# Patient Record
Sex: MALE | HISPANIC OR LATINO | Employment: FULL TIME | URBAN - METROPOLITAN AREA
[De-identification: names, ages, dates, MRNs, and addresses within clinical notes are randomized per-mention and may not be internally consistent; named-entity substitution may affect disease eponyms.]

---

## 2018-08-09 ENCOUNTER — OFFICE VISIT (OUTPATIENT)
Dept: URGENT CARE | Facility: CLINIC | Age: 38
End: 2018-08-09
Payer: COMMERCIAL

## 2018-08-09 VITALS
BODY MASS INDEX: 29.21 KG/M2 | WEIGHT: 197.2 LBS | TEMPERATURE: 97.9 F | RESPIRATION RATE: 16 BRPM | OXYGEN SATURATION: 96 % | DIASTOLIC BLOOD PRESSURE: 78 MMHG | HEIGHT: 69 IN | HEART RATE: 85 BPM | SYSTOLIC BLOOD PRESSURE: 116 MMHG

## 2018-08-09 DIAGNOSIS — M10.9 ACUTE GOUT OF MULTIPLE SITES, UNSPECIFIED CAUSE: ICD-10-CM

## 2018-08-09 PROCEDURE — 99204 OFFICE O/P NEW MOD 45 MIN: CPT | Performed by: NURSE PRACTITIONER

## 2018-08-09 RX ORDER — INDOMETHACIN 50 MG/1
50 CAPSULE ORAL 3 TIMES DAILY
Qty: 60 CAP | Refills: 0 | Status: SHIPPED | OUTPATIENT
Start: 2018-08-09

## 2018-08-09 NOTE — LETTER
August 9, 2018         Patient: Morales Guadarrama   YOB: 1980   Date of Visit: 8/9/2018           To Whom it May Concern:    Morales Guadarrama was seen in my clinic on 8/9/2018. He is able to return to work without restrictions.     If you have any questions or concerns, please don't hesitate to call.        Sincerely,           TORSTEN Mendez.  Electronically Signed

## 2018-08-09 NOTE — PROGRESS NOTES
Chief Complaint   Patient presents with   • Toe Pain     right greater toe pain/ left ankle pain X 1 week; needs note for work        HISTORY OF PRESENT ILLNESS: Patient is a 38 y.o. male who presents to urgent care today with complaints of a gout flare. Patient reports that he has had gout since the age of 24. He has been seen by his primary care provider for this several times and has had a thorough workup, he was told his gout is related to familial history as well as alcohol use. States that he had a flareup starting one week ago to his right great toe and left ankle. He believes this onset of gout has been exacerbated by him exercising, running, and dehydration. He denies heavy alcohol use or red meat intake. He denies associated fever, chills, malaise. He typically uses indomethacin for his gout, but has run out of medication. A family member gave him a few pills of indomethacin which seemed to improve his symptoms slightly. His symptoms today feel exactly like his previous episodes of gout.        There are no active problems to display for this patient.      Allergies:Patient has no known allergies.    No current [facility]-ordered outpatient prescriptions on file.     No current [facility]-ordered facility-administered medications on file.        Past Medical History:   Diagnosis Date   • Hyperlipidemia        Social History   Substance Use Topics   • Smoking status: Current Some Day Smoker     Types: Cigarettes   • Smokeless tobacco: Never Used   • Alcohol use Yes       No family status information on file.   History reviewed. No pertinent family history.    ROS:  Review of Systems   Constitutional: Negative for fever, chills, weight loss, malaise, and fatigue.   HENT: Negative for ear pain, nosebleeds, congestion, sore throat and neck pain.    Eyes: Negative for vision changes.   Neuro: Negative for headache, sensory changes, weakness, seizure, LOC.   Cardiovascular: Negative for chest pain, palpitations, orthopnea  "and leg swelling.   Respiratory: Negative for cough, sputum production, shortness of breath and wheezing.   Gastrointestinal: Negative for abdominal pain, nausea, vomiting or diarrhea.   Genitourinary: Negative for dysuria, urgency and frequency.  Musculoskeletal: Positive for joint pain. Negative for falls, neck pain, back pain, myalgias.   Skin: Negative for rash, diaphoresis.     Exam:  Blood pressure 116/78, pulse 85, temperature 36.6 °C (97.9 °F), resp. rate 16, height 1.753 m (5' 9\"), weight 89.4 kg (197 lb 3.2 oz), SpO2 96 %.  General: well-nourished, well-developed male in NAD  Head: normocephalic, atraumatic  Eyes: PERRLA, no conjunctival injection, acuity grossly intact, lids normal.  Ears: normal shape and symmetry, gross auditory acuity is intact.  Nose: symmetrical without tenderness, no discharge.  Mouth/Throat: reasonable hygiene, no erythema, exudates or tonsillar enlargement.  Neck: no masses, range of motion within normal limits, no tracheal deviation. No obvious thyroid enlargement.   Lymph: no cervical adenopathy. No supraclavicular adenopathy.   Neuro: alert and oriented. Cranial nerves 1-12 grossly intact. No sensory deficit.   Cardiovascular: regular rate and rhythm. No edema.  Pulmonary: no distress. Chest is symmetrical with respiration, no wheezes, crackles, or rhonchi.   Musculoskeletal: no clubbing, appropriate muscle tone, gait is stable. There is mild tenderness to right tophi and left ankle (from medial malleolus to Achilles). Skin is intact, no swelling, no erythema. Distal neurovascular is intact.  Skin: warm, dry, intact, no clubbing, no cyanosis, no rashes.   Psych: appropriate mood, affect, judgement.         Assessment/Plan:  1. Acute gout of multiple sites, unspecified cause  indomethacin (INDOCIN) 50 MG Cap       He will be given indomethacin today, RICE. Increase fluid intake, decrease alcohol intake, diet considerations. Supportive care, differential diagnoses, and " indications for immediate follow-up discussed with patient.   Pathogenesis of diagnosis discussed including typical length and natural progression.   Instructed to return to clinic or nearest emergency department for any change in condition, further concerns, or worsening of symptoms.  Patient states understanding of the plan of care and discharge instructions.  Instructed to make an appointment, for follow up, with his primary care provider.        Please note that this dictation was created using voice recognition software. I have made every reasonable attempt to correct obvious errors, but I expect that there are errors of grammar and possibly content that I did not discover before finalizing the note.      TORSTEN Mendez.

## 2021-08-05 ENCOUNTER — HOSPITAL ENCOUNTER (OUTPATIENT)
Facility: MEDICAL CENTER | Age: 41
End: 2021-08-05
Attending: PHYSICIAN ASSISTANT
Payer: COMMERCIAL

## 2021-08-05 ENCOUNTER — OFFICE VISIT (OUTPATIENT)
Dept: URGENT CARE | Facility: CLINIC | Age: 41
End: 2021-08-05
Payer: COMMERCIAL

## 2021-08-05 VITALS
TEMPERATURE: 97.9 F | WEIGHT: 208.2 LBS | BODY MASS INDEX: 31.55 KG/M2 | DIASTOLIC BLOOD PRESSURE: 72 MMHG | OXYGEN SATURATION: 95 % | HEIGHT: 68 IN | SYSTOLIC BLOOD PRESSURE: 142 MMHG | HEART RATE: 99 BPM | RESPIRATION RATE: 16 BRPM

## 2021-08-05 DIAGNOSIS — R53.83 FATIGUE, UNSPECIFIED TYPE: ICD-10-CM

## 2021-08-05 DIAGNOSIS — Z86.16 HISTORY OF 2019 NOVEL CORONAVIRUS DISEASE (COVID-19): ICD-10-CM

## 2021-08-05 DIAGNOSIS — R11.0 NAUSEA: ICD-10-CM

## 2021-08-05 DIAGNOSIS — R19.7 DIARRHEA, UNSPECIFIED TYPE: ICD-10-CM

## 2021-08-05 PROBLEM — M1A.00X0 CHRONIC GOUTY ARTHRITIS: Status: ACTIVE | Noted: 2020-11-03

## 2021-08-05 PROBLEM — N19 UREMIA: Status: ACTIVE | Noted: 2020-11-03

## 2021-08-05 PROCEDURE — U0005 INFEC AGEN DETEC AMPLI PROBE: HCPCS

## 2021-08-05 PROCEDURE — 99213 OFFICE O/P EST LOW 20 MIN: CPT | Mod: CS | Performed by: PHYSICIAN ASSISTANT

## 2021-08-05 PROCEDURE — U0003 INFECTIOUS AGENT DETECTION BY NUCLEIC ACID (DNA OR RNA); SEVERE ACUTE RESPIRATORY SYNDROME CORONAVIRUS 2 (SARS-COV-2) (CORONAVIRUS DISEASE [COVID-19]), AMPLIFIED PROBE TECHNIQUE, MAKING USE OF HIGH THROUGHPUT TECHNOLOGIES AS DESCRIBED BY CMS-2020-01-R: HCPCS

## 2021-08-05 RX ORDER — COLCHICINE 0.6 MG/1
0.6 TABLET ORAL DAILY
COMMUNITY
Start: 2021-05-21

## 2021-08-05 RX ORDER — FEBUXOSTAT 80 MG/1
TABLET, FILM COATED ORAL
COMMUNITY
Start: 2021-05-14

## 2021-08-05 RX ORDER — FEBUXOSTAT 80 MG/1
80 TABLET, FILM COATED ORAL
COMMUNITY
Start: 2021-05-21

## 2021-08-05 ASSESSMENT — ENCOUNTER SYMPTOMS
CHILLS: 1
SHORTNESS OF BREATH: 1
MYALGIAS: 1
FEVER: 0
DIARRHEA: 1
NAUSEA: 1

## 2021-08-05 NOTE — PROGRESS NOTES
"Subjective:   Morales De La GarzaFrances  is a 41 y.o. male who presents for Shortness of Breath (started 3 days ago, has seen improvement this morning), Diarrhea, and Fever          Shortness of Breath  Pertinent negatives include no fever.   Diarrhea   Associated symptoms include chills and myalgias. Pertinent negatives include no fever.   Fever   Associated symptoms include diarrhea and nausea.   Morales is a very pleasant 41-year-old gentleman who presents to urgent care with 3-day history of nausea, loose stools, fatigue, generalized body aches and chills. He has had no documented fever. Denies any congestion, sore throat, cough. He has had mild shortness of breath which is actually improved today. Patient reports that 4 of his coworkers are ill with a similar illness and are waiting for their Covid test results. Patient does have prior history of COVID-19 in 2020. He is not yet Covid vaccinated.  Review of Systems   Constitutional: Positive for chills and malaise/fatigue. Negative for fever.   Respiratory: Positive for shortness of breath.    Gastrointestinal: Positive for diarrhea and nausea.   Musculoskeletal: Positive for myalgias.   All other systems reviewed and are negative.    No Known Allergies  Reviewed past medical, surgical , social and family history.  Reviewed prescription and over-the-counter medications with patient and electronic health record today.     Objective:   /72 (BP Location: Left arm, Patient Position: Sitting, BP Cuff Size: Adult)   Pulse 99   Temp 36.6 °C (97.9 °F) (Temporal)   Resp 16   Ht 1.727 m (5' 8\")   Wt 94.4 kg (208 lb 3.2 oz)   SpO2 95%   BMI 31.66 kg/m²   Physical Exam  Vitals reviewed.   Constitutional:       Appearance: He is well-developed. He is not ill-appearing or toxic-appearing.   HENT:      Head: Normocephalic and atraumatic.      Right Ear: Tympanic membrane, ear canal and external ear normal.      Left Ear: Tympanic membrane, ear canal and external " ear normal.      Nose: Nose normal.      Mouth/Throat:      Lips: Pink.      Mouth: Mucous membranes are moist.      Pharynx: Uvula midline. No oropharyngeal exudate.   Eyes:      Conjunctiva/sclera: Conjunctivae normal.      Pupils: Pupils are equal, round, and reactive to light.   Cardiovascular:      Rate and Rhythm: Normal rate and regular rhythm.      Heart sounds: Normal heart sounds. No murmur heard.   No friction rub.   Pulmonary:      Effort: Pulmonary effort is normal. No respiratory distress.      Breath sounds: Normal breath sounds.   Abdominal:      General: Bowel sounds are normal.      Palpations: Abdomen is soft.      Tenderness: There is no abdominal tenderness.   Musculoskeletal:         General: Normal range of motion.      Cervical back: Normal range of motion and neck supple.   Lymphadenopathy:      Head:      Right side of head: No submental, submandibular or tonsillar adenopathy.      Left side of head: No submental, submandibular or tonsillar adenopathy.      Cervical: No cervical adenopathy.      Upper Body:      Right upper body: No supraclavicular adenopathy.      Left upper body: No supraclavicular adenopathy.   Skin:     General: Skin is warm and dry.      Findings: No rash.   Neurological:      Mental Status: He is alert and oriented to person, place, and time.      Cranial Nerves: Cranial nerves are intact. No cranial nerve deficit.      Sensory: Sensation is intact. No sensory deficit.      Motor: Motor function is intact.      Coordination: Coordination is intact. Coordination normal.      Gait: Gait is intact.   Psychiatric:         Attention and Perception: Attention normal.         Mood and Affect: Mood normal.         Speech: Speech normal.         Behavior: Behavior normal.         Thought Content: Thought content normal.         Judgment: Judgment normal.           Assessment/Plan:   1. Nausea  - SARS-CoV-2 PCR (24 hour In-House): Collect NP swab in [facility]3]; Future    2. Diarrhea,  unspecified type  - SARS-CoV-2 PCR (24 hour In-House): Collect NP swab in [facility]; Future    3. Fatigue, unspecified type  - SARS-CoV-2 PCR (24 hour In-House): Collect NP swab in [facility]; Future    4. History of 2019 novel coronavirus disease (COVID-19)    Testing performed for COVID-19.  Patient/guardian is given printed /MyChart instructions regarding self-isolation.  Work/school note is provided with specific return to work/school protocols.  Reviewed with patient/guardian that if they do test positive they will be contacted by their local health department regarding return to work/school protocols.  Results will also be released to patient/guardian in MyChart or called to the patient/guardian directly.  Encouraged mask use, frequent handwashing, wiping down hard surfaces, etc.    Pedialyte, BRAT diet with advance as tolerated.     Offered Zofran, patient feels he does not need at this time.     Differential diagnosis, natural history, supportive care, and indications for immediate follow-up discussed.     Red flag warning symptoms and strict ER/follow-up precautions given.  The patient demonstrated a good understanding and agreed with the treatment plan.    Upon entering exam room I ensured patient was wearing a mask.  This provider wore appropriate PPE throughout entire visit.  Patient wore mask entire visit except for a brief period while examining oropharynx.    Please note that this note was created using voice recognition speech to text software. Every effort has been made to correct obvious errors.  However, I expect there are errors of grammar and possibly context that were not discovered prior to finalizing the note  YUDI Clark PA-C

## 2021-08-05 NOTE — LETTER
August 5, 2021         Patient: Morales Guadarrama   YOB: 1980   Date of Visit: 8/5/2021           To Whom it May Concern:  Concern for COVID-19 illness has been identified and testing is in progress.  The results are available through our electronic delivery system called [employer].    We are asking employers to excuse absence while they follow self isolation protocol per CDC guidelines    • At least 10 days since symptoms first appeared AND  • At least 24 hours with no fever greater than 100.4 without fever reducing medication AND  • Symptoms have improved.     If results are negative, you must continue to follow the self-isolation protocol. You may return to work when you have no fever for at least 24 hours without the use of fever-reducing medication, and symptoms have improved.     If the results of testing are positive then you will be contacted by your [facility] department for further instructions on duration of self-isolation and return to work. In general, this will also follow the CDC guidelines with minimum of 10 days from the onset of symptoms, and symptoms are improving without fever.       This is the only note that will be provided from [facility] beds at the [facility] for this visit. Please schedule a visit with a primary care provider if FMLA, disability, or unemployment paperwork is required.       Sincerely,    Halie Clark, P.A.-C.  [phone]    Electronically Signed

## 2021-08-05 NOTE — PATIENT INSTRUCTIONS
INSTRUCTIONS FOR COVID-19 OR ANY OTHER INFECTIOUS RESPIRATORY ILLNESSES    The Centers for Disease Control and Prevention (CDC) states that early indications for COVID-19 include cough, shortness of breath, difficulty breathing, or at least two of the following symptoms: chills, shaking with chills, muscle pain, headache, sore throat, and loss of taste or smell. Symptoms can range from mild to severe and may appear up to two weeks after exposure to the virus.    The practice of self-isolation and quarantine helps protect the public and your family by  preventing exposure to people who have or may have a contagious disease. Please follow the prevention steps below as based on CDC guidelines:    WHEN TO STOP ISOLATION: Persons with COVID-19 or any other infectious respiratory illness who have symptoms and were advised to care for themselves at home may discontinue home isolation under the following conditions:  · At least 24 hours have passed since recovery defined as resolution of fever without the use of fever-reducing medications; AND,  · Improvement in respiratory symptoms (e.g., cough, shortness of breath); AND,  · At least 10 days have passed since symptoms first appeared and have had no subsequent illness.    MONITOR YOUR SYMPTOMS: If your illness is worsening, seek prompt medical attention. If you have a medical emergency and need to call 911, notify the dispatch personnel that you have, or are being evaluated for confirmed or suspected COVID-19 or another infectious respiratory illness. Wear a facemask if possible.    ACTIVITY RESTRICTION: restrict activities outside your home, except for getting medical care. Do not go to work, school, or public areas. Avoid using public transportation, ride-sharing, or taxis.    SCHEDULED MEDICAL APPOINTMENTS: Notify your provider that you have, or are being evaluated for, confirmed or suspected COVID-19 or another infectious respiratory. This will help the healthcare  provider’s office safely take care of you and keep other people from getting exposed or infected.    FACEMASKS, when to wear: Anytime you are away from your home or around other people or pets. If you are unable to wear one, maintain a minimum of 6 feet distancing from others.    LIVING ENVIRONMENT: Stay in a separate room from other people and pets. If possible, use a separate bathroom, have someone else care for your pets and avoid sharing household items. Any items used should be washed thoroughly with soap and water. Clean all “high-touch” surfaces every day. Use a household cleaning spray or wipe, according to the label instructions. High touch surfaces include (but are not limited to) counters, tabletops, doorknobs, bathroom fixtures, toilets, phones, keyboards, tablets, and bedside tables.     HAND WASHING: Frequently wash hands with soap and water for at least 20 seconds,  especially after blowing your nose, coughing, or sneezing; going to the bathroom; before and after interacting with pets; and before and after eating or preparing food. If hands are visibly dirty use soap and water. If soap and water are not available, use an alcohol-based hand  with at least 60% alcohol. Avoid touching your eyes, nose, and mouth with unwashed hands. Cover your coughs and sneezes with a tissue. Throw used tissues in a lined trash can. Immediately wash your hands.    ACTIVE/FACILITATED SELF-MONITORING: Follow instructions provided by your local health department or health professionals, as appropriate. When working with your local health department check their available hours.    [facility]   Phone Number   [facility] [phone]   [facility] [place] [phone]   [place] [address]   [place] [phone]     IF YOU HAVE CONFIRMED POSITIVE COVID-19:    Those who have completely recovered from COVID-19 may have immune-boosting antibodies in their plasma--called “convalescent plasma”--that could be  used to treat critically ill COVID19 patients.    [facility] is excited to begin working with [facility] on collecting convalescent plasma from  people who have recovered from COVID-19 as part of a program to treat patients infected with the virus. This FDA-approved “emergency investigational new drug” is a special blood product containing antibodies that may give patients an extra boost to fight the virus.    To be eligible to donate convalescent plasma, you must have a prior COVID-19 diagnosis documented by a laboratory test (or a positive test result for SARS-CoV-2 antibodies) and meet additional eligibility requirements.    If you are interested in donating convalescent plasma or have any additional questions, please contact the [facility] Convalescent Plasma  at [phone] or via e-mail at [email or web address].

## 2021-08-06 DIAGNOSIS — R11.0 NAUSEA: ICD-10-CM

## 2021-08-06 DIAGNOSIS — R53.83 FATIGUE, UNSPECIFIED TYPE: ICD-10-CM

## 2021-08-06 DIAGNOSIS — R19.7 DIARRHEA, UNSPECIFIED TYPE: ICD-10-CM

## 2021-08-06 LAB
COVID ORDER STATUS COVID19: NORMAL
SARS-COV-2 RNA RESP QL NAA+PROBE: NOTDETECTED
SPECIMEN SOURCE: NORMAL

## 2024-11-06 ENCOUNTER — OCCUPATIONAL MEDICINE (OUTPATIENT)
Dept: URGENT CARE | Facility: CLINIC | Age: 44
End: 2024-11-06
Payer: COMMERCIAL

## 2024-11-06 ENCOUNTER — APPOINTMENT (OUTPATIENT)
Dept: RADIOLOGY | Facility: IMAGING CENTER | Age: 44
End: 2024-11-06
Attending: NURSE PRACTITIONER
Payer: COMMERCIAL

## 2024-11-06 VITALS
SYSTOLIC BLOOD PRESSURE: 138 MMHG | WEIGHT: 209.88 LBS | HEART RATE: 88 BPM | BODY MASS INDEX: 31.81 KG/M2 | TEMPERATURE: 98.4 F | OXYGEN SATURATION: 98 % | DIASTOLIC BLOOD PRESSURE: 74 MMHG | RESPIRATION RATE: 16 BRPM | HEIGHT: 68 IN

## 2024-11-06 DIAGNOSIS — S51.012A LACERATION OF LEFT ELBOW, INITIAL ENCOUNTER: ICD-10-CM
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
## 2024-11-06 DIAGNOSIS — S57.02XA CRUSHING INJURY OF LEFT ELBOW, INITIAL ENCOUNTER: ICD-10-CM

## 2024-11-06 PROCEDURE — 90715 TDAP VACCINE 7 YRS/> IM: CPT

## 2024-11-06 PROCEDURE — 90471 IMMUNIZATION ADMIN: CPT

## 2024-11-06 PROCEDURE — 12001 RPR S/N/AX/GEN/TRNK 2.5CM/<: CPT

## 2024-11-06 PROCEDURE — 99204 OFFICE O/P NEW MOD 45 MIN: CPT | Mod: 25

## 2024-11-06 PROCEDURE — 73080 X-RAY EXAM OF ELBOW: CPT | Mod: TC,LT | Performed by: RADIOLOGY

## 2024-11-06 RX ORDER — AMLODIPINE BESYLATE 10 MG/1
10 TABLET ORAL DAILY
COMMUNITY

## 2024-11-06 ASSESSMENT — PAIN SCALES - GENERAL: PAINLEVEL_OUTOF10: 5=MODERATE PAIN

## 2024-11-06 ASSESSMENT — FIBROSIS 4 INDEX: FIB4 SCORE: 1.09

## 2024-11-06 NOTE — LETTER
"    EMPLOYEE’S CLAIM FOR COMPENSATION/ REPORT OF INITIAL TREATMENT  FORM C-4  PLEASE TYPE OR PRINT    EMPLOYEE’S CLAIM - PROVIDE ALL INFORMATION REQUESTED   First Name                    FREDA Nielsen                  Last Name  Tiherry Birthdate                    1980                Sex  Male Claim Number (Insurer’s Use Only)     Home Address  [address] Age  44 y.o. Height  1.727 m (5' 8\") Weight  95.2 kg (209 lb 14.1 oz) Social Security Number     [place] Zip  [address] Telephone  [phone] (home)    Mailing Address  [address] [place] Zip  [address] Primary Language Spoken  English    INSURER   THIRD-PARTY   [employer]   Employee's Occupation (Job Title) When Injury or Occupational Disease Occurred      Employer's Name/Company Name  [employer]  Telephone  [phone]    Office Mail Address (Number and Street)  [address]     Date of Injury (if applicable) 11/6/2024               Hours Injury (if applicable)  12:20 PM Date Employer Notified  11/6/2024 Last Day of Work after Injury or Occupational Disease  11/6/2024 Supervisor to Whom Injury Reported  Mono Mittal   Address or Location of Accident (if applicable)  Work [1]   What were you doing at the time of accident? (if applicable)  Disassembling racking on a forklift    How did this injury or occupational disease occur? (Be specific and answer in detail. Use additional sheet if necessary)  a co worker in another gaby dropped a metal bracket and the metal angle bracket cut my arm.   If you believe that you have an occupational disease, when did you first have knowledge of the disability and its relationship to your employment?   Witnesses to the Accident (if applicable)  Tristen Nguyễn      Nature of Injury or Occupational Disease  Laceration  Part(s) of " Body Injured or Affected  Elbow (L) N/A N/A    I CERTIFY THAT THE ABOVE IS TRUE AND CORRECT TO T HE BEST OF MY KNOWLEDGE AND THAT I HAVE PROVIDED THIS INFORMATION IN ORDER TO OBTAIN THE BENEFITS OF [place] OR [place]  I HEREBY AUTHORIZE ANY PHYSICIAN, CHIROPRACTOR, SURGEON, PRACTITIONER OR ANY OTHER PERSON, ANY HOSPITAL, INCLUDING [facility] OR [facility], ANY  MEDICAL SERVICE ORGANIZATION, ANY INSURANCE COMPANY, OR OTHER INSTITUTION OR ORGANIZATION TO RELEASE TO EACH OTHER, ANY MEDICAL OR OTHER INFORMATION, INCLUDING BENEFITS PAID OR PAYABLE, PERTINENT TO THIS INJURY OR DISEASE, EXCEPT INFORMATION RELATIVE TO DIAGNOSIS, TREATMENT AND/OR COUNSELING FOR AIDS, PSYCHOLOGICAL CONDITIONS, ALCOHOL OR CONTROLLED SUBSTANCES, FOR WHICH I MUST GIVE SPECIFIC AUTHORIZATION.  A PHOTOSTAT OF THIS AUTHORIZATION SHALL BE VALID AS THE ORIGINAL.     Date   Place Employee’s Original or  *Electronic Signature   THIS REPORT MUST BE COMPLETED AND MAILED WITHIN 3 WORKING DAYS OF TREATMENT   Place  [facility]    Name of Facility  [facility]   Date 11/6/2024 Diagnosis and Description of Injury or Occupational Disease  (S57.02XA) Crushing injury of left elbow, initial encounter  (S51.012A) Laceration of left elbow, initial encounter  (Y99.0) Work related injury  Diagnoses of Crushing injury of left elbow, initial encounter, Laceration of left elbow, initial encounter, and Work related injury were pertinent to this visit. Is there evidence that the injured employee was under the influence of alcohol and/or another controlled substance at the time of accident?  []No  [] Yes (if yes, please explain)   Hour 1:49 PM  No   Treatment: Suture repair, tetanus vaccination, x-ray, follow-up in 7 days    Have you advised the patient to remain off work five days or more?   [] Yes Indicate dates: From   To    [] No      If  no, is the injured employee capable of: [] full duty [] modified duty                     If modified duty, specify any limitations / restrictions:                                                                                                                                                                                                                                                                                                                                                                                                                  X-Ray Findings: Negative    From information given by the employee, together with medical evidence, can you directly connect this injury or occupational disease as job incurred?  []Yes   [] No Yes    Is additional medical care by a physician indicated? []Yes [] No  Yes    Do you know of any previous injury or disease contributing to this condition or occupational disease? []Yes [] No (Explain if yes)                          No   Date  11/6/2024 Print Health Care Provider’s Name  TERE Fermin I certify that the employer’s copy of  this form was delivered to the employer on:   Address  [address] INSURER'S USE ONLY                       [place]  [address] Provider’s Tax ID Number  [id number]   Telephone  Dept: [phone]    Health Care Provider’s Original or Electronic Signature  e-LITO Weiner Degree (MD,DO, DC,PA-C,APRN)  APRN  Choose (if applicable)      ORIGINAL - TREATING HEALTHCARE PROVIDER PAGE 2 - INSURER/TPA PAGE 3 - EMPLOYER PAGE 4 - EMPLOYEE             Form C-4 (rev.08/23)

## 2024-11-06 NOTE — LETTER
PHYSICIAN’S AND CHIROPRACTIC PHYSICIAN'S   PROGRESS REPORT   CERTIFICATION OF DISABILITY Claim Number:     Social Security Number:    Patient’s Name: Morales Guadarrama Date of Injury: 11/6/2024   Employer: [employer] Name of MCO (if applicable):      Patient’s Job Description/Occupation:        Previous Injuries/Diseases/Surgeries Contributing to the Condition:  none      Diagnosis: (S57.02XA) Crushing injury of left elbow, initial encounter  (S51.012A) Laceration of left elbow, initial encounter  (Y99.0) Work related injury      Related to the Industrial Injury? Yes     Explain: DOI 11/6/2024.  Patient Morales is a very pleasant 44-year-old gentleman who presents to urgent care today for a new work comp injury that occurred earlier today.  He states that he was at work when a metal bracket fell approximately 6 to 8 feet hitting him in his left elbow.  He does complain of bony pain and did sustain a laceration.  Range of motion remains intact despite pain.  Negative for swelling or ecchymosis.  Tetanus is out of date.        Objective Medical Findings: Left elbow: No swelling, deformity, effusion or lacerations. Normal range of motion. Tenderness present in lateral epicondyle.      Cervical back: Normal range of motion and neck supple. No rigidity.      Comments: 1 inch laceration present to left lateral elbow.  Scant bleeding in place.            None - Discharged                         Stable  Yes                 Ratable  No        Generally Improved                         Condition Worsened               X   Condition Same  May Have Suffered a Permanent Disability No     Treatment Plan:              No Change in Therapy                  PT/OT Prescribed                      Medication May be Used While Working        Case Management                          PT/OT Discontinued    Consultation    Further Diagnostic Studies:    Prescription(s)                 Released to FULL DUTY  /No Restrictions on (Date):       Certified TOTALLY TEMPORARILY DISABLED (Indicate Dates) From:   To:    X  Released to RESTRICTED/Modified Duty on (Date): From: 11/6/2024 To: 11/13/2024  Restrictions Are:         No Sitting    No Standing    No Pulling Other: No lifting with left upper extremity       No Bending at Waist     No Stooping X    No Lifting        No Carrying     No Walking Lifting Restricted to (lbs.):          No Pushing        No Climbing     No Reaching Above Shoulders       Date of Next Visit:  11/13/2024 Date of this Exam: 11/6/2024 Physician/Chiropractic Physician Name: TERE Fermin Physician/Chiropractic Physician Signature:  Jin Duarte DO MPH     [place]:  [address] [place] [address], [address] [place], [place] [address] - Telephone [phone] [place]:  [address] [place] [address], [address] [place], [place] [address] - Telephone [phone]    [email or web address]  D-39 (Rev. 10/24)

## 2024-11-06 NOTE — PROGRESS NOTES
Subjective:     Morales De La GarzaMariannaFrances is a 44 y.o. 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 States his arm feels numb)      HPI  DOI 11/6/2024.  Patient Morales is a very pleasant 44-year-old gentleman who presents to urgent care today for a new work comp injury that occurred earlier today.  He states that he was at work when a metal bracket fell approximately 6 to 8 feet hitting him in his left elbow.  He does complain of bony pain and did sustain a laceration.  Range of motion remains intact despite pain.  Negative for swelling or ecchymosis.  Tetanus is out of date.    ROS    PMH:   Past Medical History:   Diagnosis Date    Hyperlipidemia      ALLERGIES: No Known Allergies  SURGHX: No past surgical history on file.  SOCHX:   Social History     Socioeconomic History    Marital status: Single   Tobacco Use    Smoking status: Some Days     Types: Cigarettes    Smokeless tobacco: Never   Vaping Use    Vaping status: Never Used   Substance and Sexual Activity    Alcohol use: Yes    Drug use: Yes     Types: Inhaled     Comment: marijuana     Social Drivers of Health     Financial Resource Strain: Low Risk  (11/3/2020)    Received from [facility]    Overall Financial Resource Strain (CARDIA)     Difficulty of Paying Living Expenses: Not hard at all   Food Insecurity: Unknown (11/3/2020)    Received from [facility]    Hunger Vital Sign     Worried About Running Out of Food in the Last Year: Patient declined     Ran Out of Food in the Last Year: Patient declined   Transportation Needs: Unknown (11/3/2020)    Received from [facility]    PRAPARE - Transportation     Lack of Transportation (Medical): Patient declined     Lack of Transportation (Non-Medical): Patient declined   Physical Activity: Unknown (11/3/2020)    Received from  "[facility]    Exercise Vital Sign     Days of Exercise per Week: Patient declined     Minutes of Exercise per Session: Patient declined   Stress: Unknown (11/3/2020)    Received from [facility]    [place] [employer] - Occupational Stress Questionnaire     Feeling of Stress : Patient declined   Social Connections: Unknown (11/3/2020)    Received from [facility]    Social Connection and Isolation Panel [NHANES]     Frequency of Communication with Friends and Family: Patient declined     Frequency of Social Gatherings with Friends and Family: Patient declined     Attends Sikhism Services: Patient declined     Active Member of Clubs or Organizations: Patient declined     Attends Club or Organization Meetings: Patient declined     Marital Status: Patient declined     FH: No family history on file.      Objective:   /74   Pulse 88   Temp 36.9 °C (98.4 °F) (Temporal)   Resp 16   Ht 1.727 m (5' 8\")   Wt 95.2 kg (209 lb 14.1 oz)   SpO2 98%   BMI 31.91 kg/m²     Physical Exam  Vitals and nursing note reviewed.   Constitutional:       General: He is not in acute distress.     Appearance: Normal appearance. He is normal weight. He is not ill-appearing or toxic-appearing.   HENT:      Head: Normocephalic.      Right Ear: External ear normal.      Left Ear: External ear normal.      Nose: Nose normal.      Mouth/Throat:      Mouth: Mucous membranes are moist.   Eyes:      General:         Right eye: No discharge.         Left eye: No discharge.      Extraocular Movements: Extraocular movements intact.      Conjunctiva/sclera: Conjunctivae normal.      Pupils: Pupils are equal, round, and reactive to light.   Pulmonary:      Effort: Pulmonary effort is normal.      Breath sounds: Normal breath sounds.   Abdominal:      General: Abdomen is flat.   Musculoskeletal:         General: Normal range of motion.      Left elbow: No swelling, deformity, " effusion or lacerations. Normal range of motion. Tenderness present in lateral epicondyle.      Cervical back: Normal range of motion and neck supple. No rigidity.      Comments: 1 inch laceration present to left lateral elbow.  Scant bleeding in place.   Lymphadenopathy:      Cervical: No cervical adenopathy.   Skin:     General: Skin is warm and dry.   Neurological:      General: No focal deficit present.      Mental Status: He is alert and oriented to person, place, and time. Mental status is at baseline.   Psychiatric:         Mood and Affect: Mood normal.         Behavior: Behavior normal.         Judgment: Judgment normal.         Assessment/Plan:   Assessment      1. Crushing injury of left elbow, initial encounter  DX-ELBOW-COMPLETE 3+ LEFT    Tdap =>6yo IM      2. Laceration of left elbow, initial encounter  DX-ELBOW-COMPLETE 3+ LEFT    Tdap =>6yo IM      3. Work related injury  DX-ELBOW-COMPLETE 3+ LEFT        The wound was thoroughly irrigated with copious amount of normal saline.   Area was then prepped in the usual sterile fashion.    Local field block was obtained with 2% Lidocaine with Epinephrine.    Wound was cleansed and debrided of as much devitalized tissue as possible.  Wound explored and found to be relatively superficial and no foreign bodies appreciated.  I approximated the wound edges and closed the laceration with 4 interrupted sutures of 4-0 ETHILON monofilament.  Area was then cleansed and dressed.    Wound care instructions and ER precautions given.   RTC in 7-10 d for wound check/suture removal.     Tetanus vaccination given.

## 2024-11-13 ENCOUNTER — OCCUPATIONAL MEDICINE (OUTPATIENT)
Dept: URGENT CARE | Facility: CLINIC | Age: 44
End: 2024-11-13
Payer: COMMERCIAL

## 2024-11-13 VITALS
OXYGEN SATURATION: 96 % | RESPIRATION RATE: 16 BRPM | DIASTOLIC BLOOD PRESSURE: 80 MMHG | HEART RATE: 66 BPM | WEIGHT: 210.98 LBS | TEMPERATURE: 97.6 F | HEIGHT: 68 IN | BODY MASS INDEX: 31.98 KG/M2 | SYSTOLIC BLOOD PRESSURE: 118 MMHG

## 2024-11-13 DIAGNOSIS — S57.02XD: ICD-10-CM

## 2024-11-13 DIAGNOSIS — S51.012D LACERATION OF LEFT ELBOW, SUBSEQUENT ENCOUNTER: ICD-10-CM

## 2024-11-13 PROCEDURE — 99213 OFFICE O/P EST LOW 20 MIN: CPT | Performed by: NURSE PRACTITIONER

## 2024-11-13 PROCEDURE — 3074F SYST BP LT 130 MM HG: CPT | Performed by: NURSE PRACTITIONER

## 2024-11-13 PROCEDURE — 3079F DIAST BP 80-89 MM HG: CPT | Performed by: NURSE PRACTITIONER

## 2024-11-13 ASSESSMENT — ENCOUNTER SYMPTOMS
CHILLS: 0
FEVER: 0

## 2024-11-13 ASSESSMENT — FIBROSIS 4 INDEX: FIB4 SCORE: 1.09

## 2024-11-13 NOTE — PROGRESS NOTES
"Subjective     Morales De La GarzaFrances is a 44 y.o. male who presents with Follow-Up (elbow laceration, still having discomfort)            HPIMarco was at work.  He was working in an area with tall metal racking.  A coworker was working on the racking above him, unbolting brackets.  A metal bracket fell approximately 6-8 feet and struck his left elbow causing a crush injury and laceration.  On date of injury he was evaluated in the urgent care.  Wound repair with 4 interrupted sutures was performed.  X-ray of the left elbow did not show any fracture or dislocation.  Today he returns for follow up.  He reports that he is recovering well.  Denies any fever, poor wound healing or evidence of secondary infection.  He still notes aching in the left elbow and notes zings of pain down into the forearm.  No numbness, tingling or weakness.  He feels capable of restricted duty as heavy lifting increases his pain.      Review of Systems   Constitutional:  Negative for chills, fever and malaise/fatigue.     Medications, Allergies, and current problem list reviewed today in Epic           Objective     /80   Pulse 66   Temp 36.4 °C (97.6 °F) (Temporal)   Resp 16   Ht 1.727 m (5' 8\")   Wt 95.7 kg (210 lb 15.7 oz)   SpO2 96%   BMI 32.08 kg/m²      Physical Exam    Morales is alert, oriented, and in no acute distress.  Vital stable.  Afebrile.  Left elbow is warm, dry, and intact with no erythema, rash, or lesion.  Sutured laceration has healed well with good margin approximation.  4 interrupted sutures removed with no evidence of retained suture.  Wound margins are stable with palpation and elbow flexion extension.  Light bruising noted around the lateral epicondyle region and focal area is mildly TTP.  Elbow ROM intact.  Distal NV, sensation, and strength intact.                   Assessment & Plan        Assessment & Plan  Crushing injury of left elbow, subsequent encounter         Laceration of left elbow, " subsequent encounter         Discussed exam findings with Morales.  Injury appears to be healing well with no evidence of complication.  OTC analgesics as needed pain.  Light duty with restriction of no lifting more than 25 pounds.  Follow-up in 1 week for recheck.  Anticipate discharge [facility] and full duty work at that time.  He verbalized understanding of and agreed with plan of care.

## 2024-11-13 NOTE — Clinical Note
"    EMPLOYEE’S CLAIM FOR COMPENSATION/ REPORT OF INITIAL TREATMENT  FORM C-4  PLEASE TYPE OR PRINT    EMPLOYEE’S CLAIM - PROVIDE ALL INFORMATION REQUESTED   First Name                    FREDA Nielsen                  Last Name  Thierry Birthdate                    1980                Sex  Male Claim Number (Insurer’s Use Only)     Home Address  [address] Age  44 y.o. Height  1.727 m (5' 8\") Weight  95.7 kg (210 lb 15.7 oz) Social Security Number     [place] Zip  [address] Telephone  [phone] (home)    Mailing Address  [address] [place] Zip  [address] Primary Language Spoken  English    INSURER  *** THIRD-PARTY   [place] [employer]   Employee's Occupation (Job Title) When Injury or Occupational Disease Occurred      Employer's Name/Company Name  [employer]  Telephone  [phone]    Office Mail Address (Number and Street)  [address]     Date of Injury (if applicable) 11/6/2024               Hours Injury (if applicable)  12:20 PM Date Employer Notified  11/6/2024 Last Day of Work after Injury or Occupational Disease  11/6/2024 Supervisor to Whom Injury Reported  Mono Mittal   Address or Location of Accident (if applicable)  Work [1]   What were you doing at the time of accident? (if applicable)  Disassembling racking on a forklift    How did this injury or occupational disease occur? (Be specific and answer in detail. Use additional sheet if necessary)  a co worker in another gaby dropped a metal bracket and the metal angle bracket cut my arm.   If you believe that you have an occupational disease, when did you first have knowledge of the disability and its relationship to your employment?   Witnesses to the Accident (if applicable)  Tristen Nguyễn      Nature of Injury or Occupational Disease  Laceration  Part(s) " of Body Injured or Affected  Elbow (L) N/A N/A    I CERTIFY THAT THE ABOVE IS TRUE AND CORRECT TO T HE BEST OF MY KNOWLEDGE AND THAT I HAVE PROVIDED THIS INFORMATION IN ORDER TO OBTAIN THE BENEFITS OF [place]CC[place], OR CHAPTER [place]).  I HEREBY AUTHORIZE ANY PHYSICIAN, CHIROPRACTOR, SURGEON, PRACTITIONER OR ANY OTHER PERSON, ANY HOSPITAL, INCLUDING [facility] OR [facility], ANY  MEDICAL SERVICE ORGANIZATION, ANY INSURANCE COMPANY, OR OTHER INSTITUTION OR ORGANIZATION TO RELEASE TO EACH OTHER, ANY MEDICAL OR OTHER INFORMATION, INCLUDING BENEFITS PAID OR PAYABLE, PERTINENT TO THIS INJURY OR DISEASE, EXCEPT INFORMATION RELATIVE TO DIAGNOSIS, TREATMENT AND/OR COUNSELING FOR AIDS, PSYCHOLOGICAL CONDITIONS, ALCOHOL OR CONTROLLED SUBSTANCES, FOR WHICH I MUST GIVE SPECIFIC AUTHORIZATION.  A PHOTOSTAT OF THIS AUTHORIZATION SHALL BE VALID AS THE ORIGINAL.     Date   Place Employee’s Original or  *Electronic Signature   THIS REPORT MUST BE COMPLETED AND MAILED WITHIN 3 WORKING DAYS OF TREATMENT   Place  [facility]    Name of Facility  [facility]   Date 11/13/2024 Diagnosis and Description of Injury or Occupational Disease  (S57.02XD) Crushing injury of left elbow, subsequent encounter  (S51.012D) Laceration of left elbow, subsequent encounter  Diagnoses of Crushing injury of left elbow, subsequent encounter and Laceration of left elbow, subsequent encounter were pertinent to this visit. Is there evidence that the injured employee was under the influence of alcohol and/or another controlled substance at the time of accident?  ?No  ? Yes (if yes, please explain)   Hour 8:32 AM      Treatment:      Have you advised the patient to remain off work five days or more?   ? Yes Indicate dates: From   To    ? No      If no, is the injured employee capable of: ? full duty ? modified duty                     If modified  duty, specify any limitations / restrictions:                                                                                                                                                                                                                                                                                                                                                                                                                  X-Ray Findings:      From information given by the employee, together with medical evidence, can you directly connect this injury or occupational disease as job incurred?  ?Yes   ? No      Is additional medical care by a physician indicated? ?Yes ? No       Do you know of any previous injury or disease contributing to this condition or occupational disease? ?Yes ? No (Explain if yes)                              Date  11/13/2024 Print Health Care Provider’s Name  TERE [place] I certify that the employer’s copy of  this form was delivered to the employer on:   Address  [address] INSURER'S USE ONLY                       [place] Zip  [address] Provider’s Tax ID Number  [id number]   Telephone  Dept: [phone]    Health Care Provider’s Original or Electronic Signature  e-MARCY Tarango Degree (MD,DO, DC,PA-C,APRN)  {Provider Degrees:[id number]}  Choose (if applicable)      ORIGINAL - TREATING HEALTHCARE PROVIDER PAGE 2 - INSURER/TPA PAGE 3 - EMPLOYER PAGE 4 - EMPLOYEE             Form C-4 (rev.08/23)

## 2024-11-13 NOTE — LETTER
PHYSICIAN’S AND CHIROPRACTIC PHYSICIAN'S   PROGRESS REPORT   CERTIFICATION OF DISABILITY Claim Number:     Social Security Number:    Patient’s Name: Morales Guadarrama Date of Injury: 11/6/2024   Employer: [employer] Name of MCO (if applicable):      Patient’s Job Description/Occupation:        Previous Injuries/Diseases/Surgeries Contributing to the Condition:  None      Diagnosis: (S57.02XD) Crushing injury of left elbow, subsequent encounter  (S51.012D) Laceration of left elbow, subsequent encounter      Related to the Industrial Injury? Yes     Explain: Morales was at work.  He was working in an area with tall metal racking.  A coworker was working on the racking above him, unbolting brackets.  A metal bracket fell approximately 6-8 feet and struck his left elbow causing a crush injury and laceration.  On date of injury he was evaluated in the urgent care.  Wound repair with 4 interrupted sutures was performed.  X-ray of the left elbow did not show any fracture or dislocation.  Today he returns for follow up.  He reports that he is recovering well.  Denies any fever, poor wound healing or evidence of secondary infection.  He still notes aching in the left elbow and notes zings of pain down into the forearm.  No numbness, tingling or weakness.  He feels capable of restricted duty as heavy lifting increases his pain.        Objective Medical Findings: Morales is alert, oriented, and in no acute distress.  Vital stable.  Afebrile.  Left elbow is warm, dry, and intact with no erythema, rash, or lesion.  Sutured laceration has healed well with good margin approximation.  4 interrupted sutures removed with no evidence of retained suture.  Wound margins are stable with palpation and elbow flexion extension.  Light bruising noted around the lateral epicondyle region and focal area is mildly TTP.  Elbow ROM intact.  Distal NV, sensation, and strength intact.         None - Discharged                          Stable  Yes                 Ratable  No     X   Generally Improved                         Condition Worsened                  Condition Same  May Have Suffered a Permanent Disability No     Treatment Plan:    OTC analgesics as needed pain.  Light duty work x 1 week.  Follow-up in 1 week for recheck, anticipate full duty and discharge [employer] at that time.         No Change in Therapy                  PT/OT Prescribed                   X   Medication May be Used While Working        Case Management                          PT/OT Discontinued    Consultation    Further Diagnostic Studies:    Prescription(s)                 Released to FULL DUTY /No Restrictions on (Date):       Certified TOTALLY TEMPORARILY DISABLED (Indicate Dates) From:   To:      Released to RESTRICTED/Modified Duty on (Date): From:   To:    Restrictions Are:         No Sitting    No Standing    No Pulling Other:         No Bending at Waist     No Stooping     No Lifting        No Carrying     No Walking Lifting Restricted to (lbs.):  < or = to 25 pounds       No Pushing        No Climbing     No Reaching Above Shoulders       Date of Next Visit:    Date of this Exam: 11/13/2024 Physician/Chiropractic Physician Name: TERE Colorado Physician/Chiropractic Physician Signature:  Jin Duarte DO MPH     [place]:  [address] [place] [address], [address] [place], [place] [address] - Telephone [phone] [place]:  [address] [place] [address], [address] [place], [place] [address] - Telephone [phone]    [email or web address]  D-39 (Rev. 10/24)

## 2024-11-20 ENCOUNTER — OCCUPATIONAL MEDICINE (OUTPATIENT)
Dept: URGENT CARE | Facility: CLINIC | Age: 44
End: 2024-11-20
Payer: COMMERCIAL

## 2024-11-20 ENCOUNTER — APPOINTMENT (OUTPATIENT)
Dept: RADIOLOGY | Facility: IMAGING CENTER | Age: 44
End: 2024-11-20
Attending: NURSE PRACTITIONER
Payer: COMMERCIAL

## 2024-11-20 VITALS
OXYGEN SATURATION: 97 % | TEMPERATURE: 98.4 F | BODY MASS INDEX: 31.83 KG/M2 | RESPIRATION RATE: 18 BRPM | DIASTOLIC BLOOD PRESSURE: 72 MMHG | WEIGHT: 210 LBS | HEIGHT: 68 IN | SYSTOLIC BLOOD PRESSURE: 118 MMHG | HEART RATE: 84 BPM

## 2024-11-20 DIAGNOSIS — S51.012D LACERATION OF LEFT ELBOW, SUBSEQUENT ENCOUNTER: ICD-10-CM

## 2024-11-20 DIAGNOSIS — S57.02XD: ICD-10-CM

## 2024-11-20 DIAGNOSIS — G56.22 CUBITAL TUNNEL SYNDROME ON LEFT: Primary | ICD-10-CM

## 2024-11-20 PROCEDURE — 3074F SYST BP LT 130 MM HG: CPT | Performed by: NURSE PRACTITIONER

## 2024-11-20 PROCEDURE — 73080 X-RAY EXAM OF ELBOW: CPT | Mod: TC,LT | Performed by: RADIOLOGY

## 2024-11-20 PROCEDURE — 99214 OFFICE O/P EST MOD 30 MIN: CPT | Performed by: NURSE PRACTITIONER

## 2024-11-20 PROCEDURE — 3078F DIAST BP <80 MM HG: CPT | Performed by: NURSE PRACTITIONER

## 2024-11-20 ASSESSMENT — FIBROSIS 4 INDEX: FIB4 SCORE: 1.09

## 2024-11-20 NOTE — PROGRESS NOTES
Verbal consent was acquired by the patient to use Oculus VR ambient listening note generation during this visit     Date: 11/20/24        Chief Complaint   Patient presents with    Follow-Up     Anupama SPRINGER est 11/6 for L elbow.  when touched          History of Present Illness  The patient is a 44-year-old male presenting to the clinic today for his third workmen's compensation visit.    The date of injury was 11/06/2024.     ELLEN: The mechanism of injury involved a metal bracket falling approximately 6 to 8 feet and hitting his left elbow.     First visit 11/6/24: He experienced bony pain and sustained a laceration, which was repaired with 4 interrupted sutures using Ethilon. His tetanus vaccination was updated, and an X-ray showed no fractures.    During his second visit on 11/13/2024, he was still recovering. He reported aching in his left elbow and pain down his forearm, although he did not experience any numbness but mild tingling.  Sutures removed with no signs of infection he felt capable of restricted duty at that time, with work restrictions including no lifting more than 25 pounds.     Third visit 11/20/24, He has noticed a slight protrusion at the site of the laceration, which remains tender to touch. He experiences pain radiating down his forearm to the palmar aspects of the second 2 fingers.  He did not have this symptom prior to injury.  And has had the symptoms since the injury.  He states it is exacerbated by flexion of the elbow as well as at times when he is sleeping.  He denies any weakness. He has been adhering to the 25-pound lifting restriction at work. He believes he is ready to return to full duty. The pain has been present since the incident occurred but has improved compared to the initial day of the injury.  He still has pain to the acromion process of the elbow but states it is improving but has been consistent he experiences pain when extending his arm fully.  Has tingling  into the fourth and fifth finger at times.  Again denies weakness.  No fevers or bodyaches.  No new symptoms.     No previous injury,  no second job         ROS:      As otherwise stated in HPI    Medical/SX/ Social History:  Reviewed per chart    Pertinent Medications:    Current Outpatient Medications on File Prior to Visit   Medication Sig Dispense Refill    amLODIPine (NORVASC) 10 MG Tab Take 10 mg by mouth every day.      febuxostat (ULORIC) 80 MG Tab TAKE 1 TABLET BY MOUTH EVERY DAY      colchicine (COLCRYS) 0.6 MG Tab Take 0.6 mg by mouth every day. (Patient not taking: Reported on 11/20/2024)      febuxostat (ULORIC) 80 MG Tab Take 80 mg by mouth every day. (Patient not taking: Reported on 11/6/2024)      indomethacin (INDOCIN) 50 MG Cap Take 1 Cap by mouth 3 times a day. (Patient not taking: Reported on 11/6/2024) 60 Cap 0     No current facility-administered medications on file prior to visit.        Allergies:    Patient has no known allergies.     Problem list, medications, and allergies reviewed by myself today in Epic     Physical Exam:    Vitals:    11/20/24 0816   BP: 118/72   Pulse: 84   Resp: 18   Temp: 36.9 °C (98.4 °F)   SpO2: 97%             Physical Exam  Constitutional:       Appearance: Normal appearance. He is not ill-appearing or toxic-appearing.   Musculoskeletal:      Left elbow: Laceration (Laceration is healing well sutures removed previous visit.  Scabbing with no sound rounding erythema.  Only mildly tender.  Keloid formation.) present. No swelling. Decreased range of motion (All range of motion is intact patient has pain to the olcrenon  process on full extension). Tenderness present in olecranon process.      Left forearm: Normal.      Left wrist: Normal.      Left hand: Normal strength. Normal sensation. There is no disruption of two-point discrimination. Normal capillary refill. Normal pulse.      Comments: No palpable pain to the forearm or fingers although patient's radiation of  numbness tingling and pain follows the ulnar nerve pathway.  Consistent with cubital tunnel syndrome.    Neurological:      Mental Status: He is alert.            Diagnostics:    DX-ELBOW-COMPLETE 3+ LEFT    Result Date: 11/20/2024 11/20/2024 8:36 AM HISTORY/REASON FOR EXAM:  Pain/Deformity Following Trauma; continued pain after injury . injury 11/6/24. previously negative. TECHNIQUE/EXAM DESCRIPTION AND NUMBER OF VIEWS:  3 views of the LEFT elbow. COMPARISON: 11/6/2024 FINDINGS: No focal soft tissue swelling or displaced fat pad No fracture or dislocation. Small osteophyte of the coronoid process.     1.  No acute or healing fracture of LEFT elbow. 2.  Mild degenerative changes.    DX-ELBOW-COMPLETE 3+ LEFT    Result Date: 11/6/2024 11/6/2024 2:15 PM HISTORY/REASON FOR EXAM:  Pain/Deformity Following Trauma. LEFT elbow crush injury with laceration. TECHNIQUE/EXAM DESCRIPTION AND NUMBER OF VIEWS:  3 views of the LEFT elbow. COMPARISON: None FINDINGS: Lucency within the soft tissues of the posterior aspect of the elbow possibly indicating laceration.  No radiopaque foreign body. No displaced fat pad. No fracture or dislocation.     1.  No fracture or dislocation of LEFT elbow. 2.  Probable soft tissue laceration overlying the olecranon. 3.  No radiopaque foreign body.        Medical Decision making and plan :  I personally reviewed prior external notes and test results pertinent to today's visit. Pt is clinically stable at today's acute urgent care visit.  Patient appears nontoxic with no acute distress noted. Appropriate for outpatient care at this time.    Pleasant 44 y.o. male presented clinic with:     Assessment & Plan  1. Cubital Tunnel Syndrome.  Likely secondary to inflammation.   The patient continues to experience pain and tenderness in the left elbow and forearm, with occasional tingling in the fingers, indicating cubital tunnel syndrome. An x-ray will be conducted today to rule out any underlying  issues. X-ray results are normal, he is cleared for full duty next week. If full duty exacerbates his condition, he should return immediately for further evaluation and potential work restrictions. If symptoms persist, a referral to an orthopedic specialist will be considered.    2. Left Elbow Injury/laceration  The patient sustained a left elbow injury on 11/6/2024, resulting in bony pain and a laceration that was repaired with 4 interrupted sutures using Ethilon. The x-ray taken previously was negative.  Sutures removed 1 week ago.  The protrusion the patient has notices a keloid formation.  Advised that it may stay elevated although over time it may flatten out.  He reports improvement in pain but still experiences discomfort, especially with range of motion.  Although he feels ready for full duty another x-ray will be performed today to ensure proper healing.    Work restrictions: Full duty     Follow-up  Return in 1 week for follow up.      Shared decision-making was utilized with patient for treatment plan. Medication discussed included indication for use and the potential benefits and side effects. Education was provided regarding the aforementioned assessments.  Differential Diagnosis, natural history, and supportive care discussed. All of the patient's questions were answered to their satisfaction at the time of discharge. Patient was encouraged to monitor symptoms closely. Those signs and symptoms which would warrant concern and mandate seeking a higher level of service through the emergency department discussed at length.  Patient stated agreement and understanding of this plan of care.    Disposition:  Home in stable condition     Voice Recognition Disclaimer:  Portions of this document were created using voice recognition software and [employer] technology provided by [facility]. The software does have a chance of producing errors of grammar and possibly content. I have made every reasonable attempt to correct  obvious errors, but there may be errors of grammar and possibly content that I did not discover before finalizing the  documentation.    TORSTEN Owusu.

## 2024-11-20 NOTE — LETTER
PHYSICIAN’S AND CHIROPRACTIC PHYSICIAN'S   PROGRESS REPORT   CERTIFICATION OF DISABILITY Claim Number:     Social Security Number:    Patient’s Name: Morales Guadarrama Date of Injury: 11/6/2024   Employer: [employer] Name of MCO (if applicable):      Patient’s Job Description/Occupation:        Previous Injuries/Diseases/Surgeries Contributing to the Condition:  none      Diagnosis: (G56.22) Cubital tunnel syndrome on left  (primary encounter diagnosis)  (S57.02XD) Crushing injury of left elbow, subsequent encounter  (S51.012D) Laceration of left elbow, subsequent encounter      Related to the Industrial Injury? Yes     Explain: Happened at work       Objective Medical Findings: Healing laceration with scab.  No signs of secondary infection.  Keloid formation.  Range of motion is intact although with full extension patient has pain focal to the olecranon in process.  Tender palpation to the olecranon process.  Radiation of pain from the elbow down to the  palmar aspect of the third and fourth fingers consistent with cubital tunnel syndrome.  This has been an ongoing symptom since injury.  No symptoms of this prior.  No weakness cap refill less than 3 seconds.         None - Discharged                         Stable  No                 Ratable  No     X   Generally Improved                         Condition Worsened               X   Condition Same  May Have Suffered a Permanent Disability No     Treatment Plan:    General improvement although patient is continuing to have symptoms.  He does feel ready to return to full duty.  Due to continued focal pain did pedro-ray.  Fortunately x-ray continues to be negative with no signs of fracture.  Will trial 1 week full duty.  Continue ibuprofen ice as needed.  Follow-up in 1 week certainly sooner if symptoms exacerbate with full duty.         No Change in Therapy                  PT/OT Prescribed                      Medication May be Used  While Working        Case Management                          PT/OT Discontinued    Consultation    Further Diagnostic Studies:    Prescription(s)      Per recommendations as there is continued pain to the bony process 10 days after injury.  Second x-ray obtained fortunately negative.        X  Released to FULL DUTY /No Restrictions on (Date):  11/20/2024    Certified TOTALLY TEMPORARILY DISABLED (Indicate Dates) From:   To:      Released to RESTRICTED/Modified Duty on (Date): From:   To:    Restrictions Are:  Temporary      No Sitting    No Standing    No Pulling Other:         No Bending at Waist     No Stooping     No Lifting        No Carrying     No Walking Lifting Restricted to (lbs.):          No Pushing        No Climbing     No Reaching Above Shoulders       Date of Next Visit:  11/27/2024 Date of this Exam: 11/20/2024 Physician/Chiropractic Physician Name: TERE Owusu Physician/Chiropractic Physician Signature:  Jin Duarte DO MPH     [place]:  [address] [place] [address], [address] [place], [place] [address] - Telephone [phone] [place]:  [address] [place] [address], [address] [place], [place] [address] - Telephone [phone]    [email or web address]  D-39 (Rev. 10/24)

## 2024-11-27 ENCOUNTER — OCCUPATIONAL MEDICINE (OUTPATIENT)
Dept: URGENT CARE | Facility: CLINIC | Age: 44
End: 2024-11-27
Payer: COMMERCIAL

## 2024-11-27 VITALS
OXYGEN SATURATION: 95 % | WEIGHT: 208.78 LBS | RESPIRATION RATE: 14 BRPM | HEIGHT: 68 IN | HEART RATE: 69 BPM | TEMPERATURE: 97.8 F | DIASTOLIC BLOOD PRESSURE: 84 MMHG | BODY MASS INDEX: 31.64 KG/M2 | SYSTOLIC BLOOD PRESSURE: 128 MMHG

## 2024-11-27 DIAGNOSIS — S51.012D LACERATION OF LEFT ELBOW, SUBSEQUENT ENCOUNTER: ICD-10-CM

## 2024-11-27 DIAGNOSIS — G56.22 CUBITAL TUNNEL SYNDROME ON LEFT: ICD-10-CM

## 2024-11-27 DIAGNOSIS — S57.02XD: ICD-10-CM
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
## 2024-11-27 PROCEDURE — 3074F SYST BP LT 130 MM HG: CPT | Performed by: NURSE PRACTITIONER

## 2024-11-27 PROCEDURE — 1125F AMNT PAIN NOTED PAIN PRSNT: CPT | Performed by: NURSE PRACTITIONER

## 2024-11-27 PROCEDURE — 3079F DIAST BP 80-89 MM HG: CPT | Performed by: NURSE PRACTITIONER

## 2024-11-27 PROCEDURE — 99213 OFFICE O/P EST LOW 20 MIN: CPT | Performed by: NURSE PRACTITIONER

## 2024-11-27 ASSESSMENT — FIBROSIS 4 INDEX: FIB4 SCORE: 1.09

## 2024-11-27 ASSESSMENT — ENCOUNTER SYMPTOMS
TINGLING: 1
CHILLS: 0
FEVER: 0
MYALGIAS: 1

## 2024-11-27 ASSESSMENT — PAIN SCALES - GENERAL: PAINLEVEL_OUTOF10: 3=SLIGHT PAIN

## 2024-11-27 NOTE — LETTER
PHYSICIAN’S AND CHIROPRACTIC PHYSICIAN'S   PROGRESS REPORT   CERTIFICATION OF DISABILITY Claim Number:     Social Security Number:    Patient’s Name: Morales Guadarrama Date of Injury: 11/6/2024   Employer: [employer] Name of MCO (if applicable):      Patient’s Job Description/Occupation:        Previous Injuries/Diseases/Surgeries Contributing to the Condition:         Diagnosis: (G56.22) Cubital tunnel syndrome on left  (S57.02XD) Crushing injury of left elbow, subsequent encounter  (S51.012D) Laceration of left elbow, subsequent encounter  (Y99.0) Work related injury      Related to the Industrial Injury? Yes     Explain: DOI: 11/6/2024.COPIED FROM PREVIOUS VISIT: Third visit 11/20/24, He has noticed a slight protrusion at the site of the laceration, which remains tender to touch. He experiences pain radiating down his forearm to the palmar aspects of the second 2 fingers.  He did not have this symptom prior to injury.  And has had the symptoms since the injury.  He states it is exacerbated by flexion of the elbow as well as at times when he is sleeping.  He denies any weakness. He has been adhering to the 25-pound lifting restriction at work. He believes he is ready to return to full duty. The pain has been present since the incident occurred but has improved compared to the initial day of the injury.  He still has pain to the acromion process of the elbow but states it is improving but has been consistent he experiences pain when extending his arm fully.  Has tingling into the fourth and fifth finger at times.  Again denies weakness.  No fevers or bodyaches.  No new symptoms. No previous injury,  no second job.    TODAY, FOURTH ENCOUNTER. 11/27/2024. States still experiencing pain at olecranon region of left elbow with full extension. Still experiencing pain at lateral aspect of left elbow joint with full extension. Still experiencing tingling in 4th-5th digits of left hand.   States he is able to perform his full duty but will not heavy lifting at this time though this was not a restriction from last visit.  Denies weakness with left hand .  Right hand dominant.  States he was discussed at last visit that if he was still having the symptoms a referral to orthopedic may be placed.        Objective Medical Findings: Full range of motion with flexion and extension with mild discomfort on extension.  No swelling, redness, bruising or deformity of left elbow joint.  Keloid formation still present at site of laceration.  CRT less than 2 seconds in all fingers of left hand.  Equal handgrip strength, 5/5.          None - Discharged                         Stable  Yes                 Ratable  No        Generally Improved                         Condition Worsened               X   Condition Same  May Have Suffered a Permanent Disability No     Treatment Plan:    -Patient can continue with full duty without restrictions at this time  -Will refer into orthopedics for further evaluation of pain and tingling in left hand  -Patient to refer into [facility] occupational medicine at next visit in 2 weeks         No Change in Therapy                  PT/OT Prescribed                      Medication May be Used While Working        Case Management                          PT/OT Discontinued    Consultation    Further Diagnostic Studies:    Prescription(s)               X  Released to FULL DUTY /No Restrictions on (Date):       Certified TOTALLY TEMPORARILY DISABLED (Indicate Dates) From:   To:      Released to RESTRICTED/Modified Duty on (Date): From:   To:    Restrictions Are:         No Sitting    No Standing    No Pulling Other:         No Bending at Waist     No Stooping     No Lifting        No Carrying     No Walking Lifting Restricted to (lbs.):          No Pushing        No Climbing     No Reaching Above Shoulders       Date of Next Visit:  12/12/2024 Date of this Exam: 11/27/2024  Physician/Chiropractic Physician Name: TERE Cormier Physician/Chiropractic Physician Signature:  Jin Duarte DO MPH     [place]:  [address] [place] [address], [address] [place], [place] [address] - Telephone [phone] [place]:  [address] [place] [address], [address] [place], [place] [address] - Telephone [phone]    [email or web address]  D-39 (Rev. 10/24)

## 2024-11-27 NOTE — Clinical Note
PHYSICIAN’S AND CHIROPRACTIC PHYSICIAN'S   PROGRESS REPORT   CERTIFICATION OF DISABILITY Claim Number:     Social Security Number:    Patient’s Name: Morales Guadarrama Date of Injury: 11/6/2024   Employer: [employer] Name of MCO (if applicable):      Patient’s Job Description/Occupation:        Previous Injuries/Diseases/Surgeries Contributing to the Condition:         Diagnosis: (G56.22) Cubital tunnel syndrome on left  (S57.02XD) Crushing injury of left elbow, subsequent encounter  (S51.012D) Laceration of left elbow, subsequent encounter  (Y99.0) Work related injury      Related to the Industrial Injury? Yes     Explain: DOI: 11/6/2024.COPIED FROM PREVIOUS VISIT: Third visit 11/20/24, He has noticed a slight protrusion at the site of the laceration, which remains tender to touch. He experiences pain radiating down his forearm to the palmar aspects of the second 2 fingers.  He did not have this symptom prior to injury.  And has had the symptoms since the injury.  He states it is exacerbated by flexion of the elbow as well as at times when he is sleeping.  He denies any weakness. He has been adhering to the 25-pound lifting restriction at work. He believes he is ready to return to full duty. The pain has been present since the incident occurred but has improved compared to the initial day of the injury.  He still has pain to the acromion process of the elbow but states it is improving but has been consistent he experiences pain when extending his arm fully.  Has tingling into the fourth and fifth finger at times.  Again denies weakness.  No fevers or bodyaches.  No new symptoms. No previous injury,  no second job.    TODAY, FOURTH ENCOUNTER. 11/27/2024. States still experiencing pain at olecranon region of left elbow with full extension. Still experiencing pain at lateral aspect of left elbow joint with full extension. Still experiencing tingling in 4th-5th digits of left hand.   States he is able to perform his full duty but will not heavy lifting at this time though this was not a restriction from last visit.  Denies weakness with left hand .  Right hand dominant.  States he was discussed at last visit that if he was still having the symptoms a referral to orthopedic may be placed.        Objective Medical Findings: Full range of motion with flexion and extension with mild discomfort on extension.  No swelling, redness, bruising or deformity of left elbow joint.  Keloid formation still present at site of laceration.  CRT less than 2 seconds in all fingers of left hand.  Equal handgrip strength, 5/5.          None - Discharged                         Stable  Yes                 Ratable  No        Generally Improved                         Condition Worsened               X   Condition Same  May Have Suffered a Permanent Disability No     Treatment Plan:    -Patient can continue with full duty without restrictions at this time  -Will refer into orthopedics for further evaluation of pain and tingling in left hand  -Patient to refer into [facility] occupational medicine at next visit in 2 weeks         No Change in Therapy                  PT/OT Prescribed                      Medication May be Used While Working        Case Management                          PT/OT Discontinued    Consultation    Further Diagnostic Studies:    Prescription(s)               X  Released to FULL DUTY /No Restrictions on (Date):       Certified TOTALLY TEMPORARILY DISABLED (Indicate Dates) From:   To:      Released to RESTRICTED/Modified Duty on (Date): From:   To:    Restrictions Are:         No Sitting    No Standing    No Pulling Other:         No Bending at Waist     No Stooping     No Lifting        No Carrying     No Walking Lifting Restricted to (lbs.):          No Pushing        No Climbing     No Reaching Above Shoulders       Date of Next Visit:  12/12/2024 Date of this Exam: 11/27/2024  Physician/Chiropractic Physician Name: TERE Cormier Physician/Chiropractic Physician Signature:  Jin Duarte DO MPH     [place]:  [address] [place] [address], [address] [place], [place] [address] - Telephone [phone] [place]:  [address] [place] [address], [address] [place], [place] [address] - Telephone [phone]    [email or web address]  D-39 (Rev. 10/24)

## 2024-11-27 NOTE — PROGRESS NOTES
Subjective     Morales Guadarrama is a 44 y.o. male who presents with Work-Related Injury (Anupama W/C FV DOI 11/6/24 Left Elbow laceration)            HPI  DOI: 11/6/2024.COPIED FROM PREVIOUS VISIT: Third visit 11/20/24, He has noticed a slight protrusion at the site of the laceration, which remains tender to touch. He experiences pain radiating down his forearm to the palmar aspects of the second 2 fingers.  He did not have this symptom prior to injury.  And has had the symptoms since the injury.  He states it is exacerbated by flexion of the elbow as well as at times when he is sleeping.  He denies any weakness. He has been adhering to the 25-pound lifting restriction at work. He believes he is ready to return to full duty. The pain has been present since the incident occurred but has improved compared to the initial day of the injury.  He still has pain to the acromion process of the elbow but states it is improving but has been consistent he experiences pain when extending his arm fully.  Has tingling into the fourth and fifth finger at times.  Again denies weakness.  No fevers or bodyaches.  No new symptoms. No previous injury,  no second job.     TODAY, FOURTH ENCOUNTER. 11/27/2024. States still experiencing pain at olecranon region of left elbow with full extension. Still experiencing pain at lateral aspect of left elbow joint with full extension. Still experiencing tingling in 4th-5th digits of left hand.  States he is able to perform his full duty but will not heavy lifting at this time though this was not a restriction from last visit.  Denies weakness with left hand .  Right hand dominant.  States he was discussed at last visit that if he was still having the symptoms a referral to orthopedic may be placed.    PMH: No pertinent past medical history to this problem  MEDS: Medications were reviewed in Epic  ALLERGIES: Allergies were reviewed in Epic  FH: No pertinent family history to this  "problem     Review of Systems   Constitutional:  Negative for chills, fever and malaise/fatigue.   Musculoskeletal:  Positive for joint pain and myalgias.   Neurological:  Positive for tingling.   All other systems reviewed and are negative.             Objective     /84   Pulse 69   Temp 36.6 °C (97.8 °F) (Temporal)   Resp 14   Ht 1.727 m (5' 8\")   Wt 94.7 kg (208 lb 12.4 oz)   SpO2 95%   BMI 31.74 kg/m²      Physical Exam  Vitals reviewed.   Constitutional:       General: He is awake. He is not in acute distress.  Cardiovascular:      Rate and Rhythm: Normal rate.   Pulmonary:      Effort: Pulmonary effort is normal.   Musculoskeletal:      Left elbow: Tenderness present in olecranon process.      Left hand: Normal.   Skin:     General: Skin is warm and dry.   Neurological:      Mental Status: He is alert and oriented to person, place, and time.   Psychiatric:         Attention and Perception: Attention normal.         Mood and Affect: Mood normal.         Speech: Speech normal.         Behavior: Behavior normal. Behavior is cooperative.         Full range of motion with flexion and extension with mild discomfort on extension.  No swelling, redness, bruising or deformity of left elbow joint.  Keloid formation still present at site of laceration.  CRT less than 2 seconds in all fingers of left hand.  Equal handgrip strength, 5/5.                    Assessment & Plan        Assessment & Plan  Cubital tunnel syndrome on left    Orders:    Referral to Orthopedics    Referral to Occupational Medicine    Crushing injury of left elbow, subsequent encounter    Orders:    Referral to Orthopedics    Referral to Occupational Medicine    Laceration of left elbow, subsequent encounter    Orders:    Referral to Orthopedics    Referral to Occupational Medicine    Work related injury    Orders:    Referral to Orthopedics    Referral to Occupational Medicine   -Patient can continue with full duty without restrictions " at this time  -Will refer into orthopedics for further evaluation of pain and tingling in left hand  -Patient to refer into [facility] occupational medicine at next visit in 2 weeks

## 2024-12-12 ENCOUNTER — OCCUPATIONAL MEDICINE (OUTPATIENT)
Dept: OCCUPATIONAL MEDICINE | Facility: CLINIC | Age: 44
End: 2024-12-12
Payer: COMMERCIAL

## 2024-12-12 VITALS
OXYGEN SATURATION: 95 % | HEIGHT: 68 IN | DIASTOLIC BLOOD PRESSURE: 82 MMHG | TEMPERATURE: 97.8 F | BODY MASS INDEX: 31.74 KG/M2 | HEART RATE: 87 BPM | SYSTOLIC BLOOD PRESSURE: 130 MMHG

## 2024-12-12 DIAGNOSIS — G56.22 CUBITAL TUNNEL SYNDROME ON LEFT: ICD-10-CM

## 2024-12-12 DIAGNOSIS — S57.02XD: ICD-10-CM

## 2024-12-12 PROCEDURE — 1125F AMNT PAIN NOTED PAIN PRSNT: CPT | Performed by: PREVENTIVE MEDICINE

## 2024-12-12 PROCEDURE — 99213 OFFICE O/P EST LOW 20 MIN: CPT | Performed by: PREVENTIVE MEDICINE

## 2024-12-12 PROCEDURE — 3079F DIAST BP 80-89 MM HG: CPT | Performed by: PREVENTIVE MEDICINE

## 2024-12-12 PROCEDURE — 3075F SYST BP GE 130 - 139MM HG: CPT | Performed by: PREVENTIVE MEDICINE

## 2024-12-12 ASSESSMENT — PAIN SCALES - GENERAL: PAINLEVEL_OUTOF10: 1=MINIMAL PAIN

## 2024-12-12 NOTE — LETTER
PHYSICIAN’S AND CHIROPRACTIC PHYSICIAN'S   PROGRESS REPORT   CERTIFICATION OF DISABILITY Claim Number:     Social Security Number:    Patient’s Name: Morales Guadarrama Date of Injury: 11/6/2024   Employer: [employer] Name of MCO (if applicable):      Patient’s Job Description/Occupation:        Previous Injuries/Diseases/Surgeries Contributing to the Condition:         Diagnosis: (S57.02XD) Crushing injury of left elbow, subsequent encounter  (G56.22) Cubital tunnel syndrome on left      Related to the Industrial Injury? Yes     Explain:        Objective Medical Findings: Left elbow: Well-healed laceration near the olecranon process.  Full range of motion with minimal discomfort.  Strength intact.  Tinel's at the cubital tunnel negative.         None - Discharged                         Stable  No                 Ratable  No        Generally Improved                         Condition Worsened               X   Condition Same  May Have Suffered a Permanent Disability No     Treatment Plan:    Referral to orthopedics approved, pending scheduling, has called but is not received a call back  OTC ibuprofen/Tylenol as needed  Exercises and stretches as tolerated  Follow-up here if not seen by orthopedics         No Change in Therapy                  PT/OT Prescribed                      Medication May be Used While Working        Case Management                          PT/OT Discontinued    Consultation    Further Diagnostic Studies:    Prescription(s)               X  Released to FULL DUTY /No Restrictions on (Date):  12/12/2024    Certified TOTALLY TEMPORARILY DISABLED (Indicate Dates) From:   To:      Released to RESTRICTED/Modified Duty on (Date): From:   To:    Restrictions Are:         No Sitting    No Standing    No Pulling Other:         No Bending at Waist     No Stooping     No Lifting        No Carrying     No Walking Lifting Restricted to (lbs.):          No Pushing         No Climbing     No Reaching Above Shoulders       Date of Next Visit:   TORI Date of this Exam: 12/12/2024 Physician/Chiropractic Physician Name: Jin Duarte D.O. Physician/Chiropractic Physician Signature:  Jin Duarte DO MPH     [place]:  [facility]  [facility], [address] [place], [place] [address] - Telephone [phone] [place]:  [address] [place] [address], [address] [place], [place] [address] - Telephone [phone]    [email or web address]  D-39 (Rev. 10/24)

## 2024-12-12 NOTE — PROGRESS NOTES
"Subjective:     Morales Guadarrama is a 44 y.o. male who presents for Follow-Up ( DOI: 11/6/24 - left elbow - exam rm 19 - b)    ELLEN: Metal bracket fell from approximately 6 feet striking his left elbow.  He sustained a laceration.  He was seen in urgent care x 4, left elbow x-rays were negative for acute findings.  Wound was sutured and subsequently removed about 7 days later.  He remained with some numbness and tingling in the hand as well as mild discomfort with use of the arm and was referred to occupational health and orthopedics    12/12/2024: Patient states that overall symptoms continue to improve.  He only has minimal discomfort with full extension of the elbow.  He states that he does feel slightly weaker.  He states he was trying to do push-ups the other day and noticed they were not nearly as easy as before.  He also notes some continued intermittent tingling at the ulnar aspect of the hand into the fifth digit.  He notes some decreased sensation in this area as well.  He has been working full duty without difficulty.  He was contacted and told that the orthopedic referral been approved.  He tried calling the orthopedic office yesterday and they told him they would call them back to schedule.  He has not heard anything yet.      ROS: All systems were reviewed on intake form, form was reviewed and signed. See scanned documents in media. Pertinent positives and negatives included in HPI.    PMH: No pertinent past medical history to this problem  MEDS: Medications were reviewed in Epic  ALLERGIES: No Known Allergies  SOCHX: Works as a  at [employer]  FH: No pertinent family history to this problem       Objective:     /82 (BP Location: Right arm, Patient Position: Sitting, BP Cuff Size: Large adult)   Pulse 87   Temp 36.6 °C (97.8 °F) (Temporal)   Ht 1.727 m (5' 8\")   SpO2 95%   BMI 31.74 kg/m²     Constitutional: Patient is in no acute distress. Appears well-developed " and well-nourished.   Cardiovascular: Normal rate.    Pulmonary/Chest: Effort normal. No respiratory distress.   Neurological: Patient is alert and oriented to person, place, and time.   Skin: Skin is warm and dry.   Psychiatric: Normal mood and affect. Behavior is normal.     Left elbow: Well-healed laceration near the olecranon process.  Full range of motion with minimal discomfort.  Strength intact.  Tinel's at the cubital tunnel negative.    Assessment/Plan:     1. Crushing injury of left elbow, subsequent encounter    2. Cubital tunnel syndrome on left      Referral to orthopedics approved, pending scheduling, has called but is not received a call back  OTC ibuprofen/Tylenol as needed  Exercises and stretches as tolerated  Follow-up here if not seen by orthopedics    Work Status: Full Duty - see D-39 or other state/federal worker's compensation forms for specific restrictions if applicable  Follow up as needed    Differential diagnosis, natural history, supportive care, and indications for immediate follow-up discussed.